# Patient Record
Sex: MALE | Race: BLACK OR AFRICAN AMERICAN | NOT HISPANIC OR LATINO | Employment: FULL TIME | URBAN - METROPOLITAN AREA
[De-identification: names, ages, dates, MRNs, and addresses within clinical notes are randomized per-mention and may not be internally consistent; named-entity substitution may affect disease eponyms.]

---

## 2024-06-01 ENCOUNTER — HOSPITAL ENCOUNTER (EMERGENCY)
Facility: HOSPITAL | Age: 24
Discharge: HOME | End: 2024-06-01
Attending: EMERGENCY MEDICINE

## 2024-06-01 VITALS
WEIGHT: 172 LBS | HEIGHT: 73 IN | DIASTOLIC BLOOD PRESSURE: 64 MMHG | TEMPERATURE: 98 F | RESPIRATION RATE: 20 BRPM | OXYGEN SATURATION: 98 % | HEART RATE: 97 BPM | BODY MASS INDEX: 22.8 KG/M2 | SYSTOLIC BLOOD PRESSURE: 126 MMHG

## 2024-06-01 DIAGNOSIS — B34.9 VIRAL SYNDROME: Primary | ICD-10-CM

## 2024-06-01 DIAGNOSIS — Z20.2 CHLAMYDIA CONTACT, UNTREATED: ICD-10-CM

## 2024-06-01 LAB
FLUAV RNA RESP QL NAA+PROBE: NEGATIVE
FLUBV RNA RESP QL NAA+PROBE: NEGATIVE
RSV RNA RESP QL NAA+PROBE: NEGATIVE
S PYO DNA THROAT QL NAA+PROBE: NOT DETECTED
SARS-COV-2 RNA RESP QL NAA+PROBE: NEGATIVE

## 2024-06-01 PROCEDURE — 0241U HB NFCT DS VIR RESP RNA 4 TRGT: CPT | Performed by: EMERGENCY MEDICINE

## 2024-06-01 PROCEDURE — 87591 N.GONORRHOEAE DNA AMP PROB: CPT | Performed by: EMERGENCY MEDICINE

## 2024-06-01 PROCEDURE — 87651 STREP A DNA AMP PROBE: CPT | Performed by: EMERGENCY MEDICINE

## 2024-06-01 PROCEDURE — 99283 EMERGENCY DEPT VISIT LOW MDM: CPT

## 2024-06-01 PROCEDURE — 87491 CHLMYD TRACH DNA AMP PROBE: CPT | Performed by: EMERGENCY MEDICINE

## 2024-06-01 PROCEDURE — 99284 EMERGENCY DEPT VISIT MOD MDM: CPT | Performed by: EMERGENCY MEDICINE

## 2024-06-01 RX ORDER — DOXYCYCLINE HYCLATE 100 MG/1
100 CAPSULE ORAL 2 TIMES DAILY
Qty: 14 CAPSULE | Refills: 0 | Status: SHIPPED | OUTPATIENT
Start: 2024-06-01 | End: 2024-06-08

## 2024-06-01 NOTE — Clinical Note
Yo Alaniz was seen and treated in our emergency department on 6/1/2024.                Diagnosis:     Yo  may return to work on return date.    He may return on this date: 06/02/2024         If you have any questions or concerns, please don't hesitate to call.      Charles Arias MD    ______________________________           _______________          _______________  Hospital Representative                              Date                                Time

## 2024-06-02 NOTE — ED PROVIDER NOTES
History  Chief Complaint   Patient presents with    Flu Symptoms     Pt arrived ambulatory with c/o flu like symptoms since yesterday     23-year-old male, presents with vomiting, diarrhea, and congestion since yesterday.  Patient states after eating a hamburger at a work picnic yesterday, he started to feel sick and vomited, had diarrhea afterwards.  Symptoms continued this morning but have been improving since.  Reports feeling subjective fevers yesterday.  Reports congestion and ear pressure.  Patient also reports that his fiancée tested positive for chlamydia and he would like to be tested, denies any symptoms such as penile discharge or dysuria.      History provided by:  Patient   used: No    Flu Symptoms  Presenting symptoms: diarrhea, fever and vomiting    Associated symptoms: nasal congestion        None       History reviewed. No pertinent past medical history.    History reviewed. No pertinent surgical history.    History reviewed. No pertinent family history.  I have reviewed and agree with the history as documented.    E-Cigarette/Vaping     E-Cigarette/Vaping Substances     Social History     Tobacco Use    Smoking status: Never    Smokeless tobacco: Current   Substance Use Topics    Alcohol use: Never    Drug use: Never       Review of Systems   Constitutional:  Positive for fever.   HENT:  Positive for congestion.    Cardiovascular: Negative.    Gastrointestinal:  Positive for diarrhea and vomiting.   Genitourinary: Negative.    Neurological: Negative.        Physical Exam  Physical Exam  Vitals and nursing note reviewed.   Constitutional:       General: He is not in acute distress.  HENT:      Head: Normocephalic and atraumatic.      Right Ear: Ear canal and external ear normal.      Left Ear: Ear canal and external ear normal.      Ears:      Comments: Clear fluid behind bilateral TMs, no erythema, no bulging     Mouth/Throat:      Mouth: Mucous membranes are moist.       Pharynx: Oropharynx is clear.   Cardiovascular:      Rate and Rhythm: Normal rate and regular rhythm.   Pulmonary:      Effort: Pulmonary effort is normal.      Breath sounds: Normal breath sounds.   Abdominal:      Palpations: Abdomen is soft.      Tenderness: There is no abdominal tenderness.   Musculoskeletal:         General: Normal range of motion.   Skin:     General: Skin is warm and dry.   Neurological:      General: No focal deficit present.      Mental Status: He is alert and oriented to person, place, and time.         Vital Signs  ED Triage Vitals [06/01/24 1912]   Temperature Pulse Respirations Blood Pressure SpO2   98 °F (36.7 °C) 97 20 126/64 98 %      Temp Source Heart Rate Source Patient Position - Orthostatic VS BP Location FiO2 (%)   Oral Monitor Sitting Left arm --      Pain Score       --           Vitals:    06/01/24 1912   BP: 126/64   Pulse: 97   Patient Position - Orthostatic VS: Sitting         Visual Acuity      ED Medications  Medications - No data to display    Diagnostic Studies  Results Reviewed       Procedure Component Value Units Date/Time    Chlamydia/GC amplified DNA by PCR [[id number]] Collected: 06/01/24 2024    Lab Status: In process Specimen: Urine, Other Updated: 06/01/24 2027    FLU/RSV/COVID - if FLU/RSV clinically relevant [[id number]]  (Normal) Collected: 06/01/24 1914    Lab Status: Final result Specimen: Nares from Nose Updated: 06/01/24 2005     SARS-CoV-2 Negative     INFLUENZA A PCR Negative     INFLUENZA B PCR Negative     RSV PCR Negative    Narrative:      FOR PEDIATRIC PATIENTS - copy/paste COVID Guidelines URL to browser: [email or web address]    SARS-CoV-2 assay is a Nucleic Acid Amplification assay intended for the  qualitative detection of nucleic acid from SARS-CoV-2 in nasopharyngeal  swabs. Results are for the presumptive identification of SARS-CoV-2 RNA.    Positive results are indicative of infection  with SARS-CoV-2, the virus  causing COVID-19, but do not rule out bacterial infection or co-infection  with other viruses. Laboratories within the [place] and its  territories are required to report all positive results to the appropriate  public health authorities. Negative results do not preclude SARS-CoV-2  infection and should not be used as the sole basis for treatment or other  patient management decisions. Negative results must be combined with  clinical observations, patient history, and epidemiological information.  This test has not been FDA cleared or approved.    This test has been authorized by FDA under an Emergency Use Authorization  (EUA). This test is only authorized for the duration of time the  declaration that circumstances exist justifying the authorization of the  emergency use of an in vitro diagnostic tests for detection of SARS-CoV-2  virus and/or diagnosis of COVID-19 infection under section 564(b)(1) of  the Act, 21 U.S.C. 360bbb-3(b)(1), unless the authorization is terminated  or revoked sooner. The test has been validated but independent review by FDA  and CLIA is pending.    Test performed using [employer]: This RT-PCR assay targets N2,  a region unique to SARS-CoV-2. A conserved region in the E-gene was chosen  for pan-Sarbecovirus detection which includes SARS-CoV-2.    According to CMS-2020-01-R, this platform meets the definition of high-throughput technology.    Strep A PCR [[id number]]  (Normal) Collected: 06/01/24 1914    Lab Status: Final result Specimen: Throat Updated: 06/01/24 1954     STREP A PCR Not Detected                   No orders to display              Procedures  Procedures         ED Course                               SBIRT 20yo+      Flowsheet Row Most Recent Value   Initial Alcohol Screen: US AUDIT-C     1. How often do you have a drink containing alcohol? 0 Filed at: 06/01/2024 1913   2. How many drinks containing alcohol do you have on a typical  day you are drinking?  0 Filed at: 06/01/2024 1913   3a. Male UNDER 65: How often do you have five or more drinks on one occasion? 0 Filed at: 06/01/2024 1913   Audit-C Score 0 Filed at: 06/01/2024 1913   SABRINA: How many times in the past year have you...    Used an illegal drug or used a prescription medication for non-medical reasons? Never Filed at: 06/01/2024 1913                      Medical Decision Making  23-year-old male, presents with symptoms of vomiting and diarrhea starting yesterday.  Differential diagnosis includes viral illness, food poisoning, dehydration.  Patient looks well in no distress, well-hydrated on exam.  Patient also reports partner who tested positive for chlamydia, will send urine for GC chlamydia, treat patient for chlamydia with oral antibiotic.  Viral PCR testing sent from triage.    Patient looks well in ED, feels well to be discharged home.  Instructed to rest, keep yourself well-hydrated by drinking fluids frequently, follow-up or return for any worsening or new concerning symptoms.    Amount and/or Complexity of Data Reviewed  Labs: ordered. Decision-making details documented in ED Course.    Risk  Prescription drug management.             Disposition  Final diagnoses:   Viral syndrome   Chlamydia contact, untreated     Time reflects when diagnosis was documented in both MDM as applicable and the Disposition within this note       Time User Action Codes Description Comment    6/1/2024  8:21 PM Charles Arias Add [B34.9] Viral syndrome     6/1/2024  8:21 PM Charles Arias Add [Z20.2] Chlamydia contact, untreated           ED Disposition       ED Disposition   Discharge    Condition   Stable    Date/Time   Sat Jun 1, 2024 2021    Comment   Yo Alaniz discharge to home/self care.                   Follow-up Information    None         Patient's Medications   Discharge Prescriptions    DOXYCYCLINE HYCLATE (VIBRAMYCIN) 100 MG CAPSULE    Take 1 capsule (100 mg total) by mouth 2  (two) times a day for 7 days       Start Date: 6/1/2024  End Date: 6/8/2024       Order Dose: 100 mg       Quantity: 14 capsule    Refills: 0       No discharge procedures on file.    PDMP Review       None            ED Provider  Electronically Signed by             Charles Arias MD  06/01/24 2027       Charles Arias MD  06/01/24 2035

## 2024-06-03 LAB
C TRACH DNA SPEC QL NAA+PROBE: POSITIVE
N GONORRHOEA DNA SPEC QL NAA+PROBE: NEGATIVE

## 2024-06-04 NOTE — RESULT ENCOUNTER NOTE
I attempted to call the patient in regards to the positive chlamydia results.  He is presently on doxycycline as he was exposed to a patient who was positive for for chlamydia per the chart notes.  I left a message for him to call for the results.

## 2024-06-04 NOTE — RESULT ENCOUNTER NOTE
Informed patient of positive chlamydia results.  Advised to abstain from sex for 7-10 days, inform partners so that they can be tested and treated as well, take antibiotics until completion.

## 2024-09-09 ENCOUNTER — APPOINTMENT (EMERGENCY)
Dept: RADIOLOGY | Facility: HOSPITAL | Age: 24
End: 2024-09-09

## 2024-09-09 ENCOUNTER — HOSPITAL ENCOUNTER (EMERGENCY)
Facility: HOSPITAL | Age: 24
Discharge: HOME | End: 2024-09-09

## 2024-09-09 VITALS
WEIGHT: 169.97 LBS | OXYGEN SATURATION: 97 % | TEMPERATURE: 97.9 F | RESPIRATION RATE: 18 BRPM | SYSTOLIC BLOOD PRESSURE: 119 MMHG | HEIGHT: 73 IN | DIASTOLIC BLOOD PRESSURE: 72 MMHG | BODY MASS INDEX: 22.53 KG/M2 | HEART RATE: 55 BPM

## 2024-09-09 DIAGNOSIS — M54.9 BACK PAIN: Primary | ICD-10-CM

## 2024-09-09 LAB
ATRIAL RATE: 51 BPM
P AXIS: -12 DEGREES
PR INTERVAL: 154 MS
QRS AXIS: -22 DEGREES
QRSD INTERVAL: 90 MS
QT INTERVAL: 402 MS
QTC INTERVAL: 370 MS
T WAVE AXIS: 9 DEGREES
VENTRICULAR RATE: 51 BPM

## 2024-09-09 PROCEDURE — 99283 EMERGENCY DEPT VISIT LOW MDM: CPT

## 2024-09-09 PROCEDURE — 94640 AIRWAY INHALATION TREATMENT: CPT

## 2024-09-09 PROCEDURE — 93010 ELECTROCARDIOGRAM REPORT: CPT | Performed by: INTERNAL MEDICINE

## 2024-09-09 PROCEDURE — 96372 THER/PROPH/DIAG INJ SC/IM: CPT

## 2024-09-09 PROCEDURE — 99284 EMERGENCY DEPT VISIT MOD MDM: CPT

## 2024-09-09 PROCEDURE — 71046 X-RAY EXAM CHEST 2 VIEWS: CPT

## 2024-09-09 PROCEDURE — 93005 ELECTROCARDIOGRAM TRACING: CPT

## 2024-09-09 RX ORDER — KETOROLAC TROMETHAMINE 30 MG/ML
30 INJECTION, SOLUTION INTRAMUSCULAR; INTRAVENOUS ONCE
Status: COMPLETED | OUTPATIENT
Start: 2024-09-09 | End: 2024-09-09

## 2024-09-09 RX ORDER — OXYCODONE HYDROCHLORIDE 5 MG/1
5 TABLET ORAL EVERY 6 HOURS PRN
Qty: 8 TABLET | Refills: 0 | Status: SHIPPED | OUTPATIENT
Start: 2024-09-09

## 2024-09-09 RX ORDER — LIDOCAINE 50 MG/G
1 PATCH TOPICAL DAILY
Qty: 30 PATCH | Refills: 0 | Status: SHIPPED | OUTPATIENT
Start: 2024-09-09

## 2024-09-09 RX ORDER — METHOCARBAMOL 500 MG/1
500 TABLET, FILM COATED ORAL 4 TIMES DAILY
Qty: 20 TABLET | Refills: 0 | Status: SHIPPED | OUTPATIENT
Start: 2024-09-09

## 2024-09-09 RX ORDER — PREDNISONE 20 MG/1
60 TABLET ORAL DAILY
Qty: 15 TABLET | Refills: 0 | Status: SHIPPED | OUTPATIENT
Start: 2024-09-09 | End: 2024-09-14

## 2024-09-09 RX ORDER — ALBUTEROL SULFATE 0.83 MG/ML
5 SOLUTION RESPIRATORY (INHALATION) ONCE
Status: COMPLETED | OUTPATIENT
Start: 2024-09-09 | End: 2024-09-09

## 2024-09-09 RX ORDER — NAPROXEN 500 MG/1
500 TABLET ORAL 2 TIMES DAILY WITH MEALS
Qty: 30 TABLET | Refills: 0 | Status: SHIPPED | OUTPATIENT
Start: 2024-09-09

## 2024-09-09 RX ORDER — METHYLPREDNISOLONE SODIUM SUCCINATE 125 MG/2ML
60 INJECTION, POWDER, LYOPHILIZED, FOR SOLUTION INTRAMUSCULAR; INTRAVENOUS ONCE
Status: COMPLETED | OUTPATIENT
Start: 2024-09-09 | End: 2024-09-09

## 2024-09-09 RX ADMIN — METHYLPREDNISOLONE SODIUM SUCCINATE 60 MG: 125 INJECTION, POWDER, FOR SOLUTION INTRAMUSCULAR; INTRAVENOUS at 12:06

## 2024-09-09 RX ADMIN — IPRATROPIUM BROMIDE 0.5 MG: 0.5 SOLUTION RESPIRATORY (INHALATION) at 12:06

## 2024-09-09 RX ADMIN — ALBUTEROL SULFATE 5 MG: 2.5 SOLUTION RESPIRATORY (INHALATION) at 12:06

## 2024-09-09 RX ADMIN — KETOROLAC TROMETHAMINE 30 MG: 30 INJECTION, SOLUTION INTRAMUSCULAR at 12:06

## 2024-09-09 NOTE — ED PROVIDER NOTES
History  Chief Complaint   Patient presents with    Back Pain     Pt arrived ambulatory c/o severe back pain onset this morning. Hx back pain, sciatica      23-year-old male patient presents to the ER for evaluation of back pain.  Patient states that he woke up with severe thoracic back pain.  Patient states that he works as a  and often lifts heavy objects.  He stated no new trauma or injury.  He reports that the pain is so severe that it hurts with deep inspiration.  Patient stated no recent URI symptoms, fever, chills, nausea or vomiting.  Patient is talking in full complete sentences without respiratory distress, tripoding or drooling.  Patient did not take any medication prior to arrival.  Patient reports that the pain is worse with range of motion.  No noted ecchymosis, erythema, open wounds or abrasions.        None       History reviewed. No pertinent past medical history.    History reviewed. No pertinent surgical history.    History reviewed. No pertinent family history.  I have reviewed and agree with the history as documented.    E-Cigarette/Vaping     E-Cigarette/Vaping Substances    THC Yes      Social History     Tobacco Use    Smoking status: Never    Smokeless tobacco: Current   Substance Use Topics    Alcohol use: Never    Drug use: Never       Review of Systems   Constitutional:  Negative for chills and fever.   HENT:  Negative for ear pain and sore throat.    Eyes:  Negative for pain and visual disturbance.   Respiratory:  Positive for chest tightness. Negative for cough and shortness of breath.    Cardiovascular:  Negative for chest pain and palpitations.   Gastrointestinal:  Negative for abdominal pain and vomiting.   Genitourinary:  Negative for dysuria and hematuria.   Musculoskeletal:  Positive for back pain. Negative for arthralgias.   Skin:  Negative for color change and rash.   Neurological:  Negative for seizures and syncope.   All other systems reviewed and are  negative.      Physical Exam  Physical Exam  Vitals and nursing note reviewed.   Constitutional:       General: He is not in acute distress.     Appearance: He is well-developed.   HENT:      Head: Normocephalic and atraumatic.      Right Ear: External ear normal.      Left Ear: External ear normal.   Eyes:      Conjunctiva/sclera: Conjunctivae normal.   Cardiovascular:      Rate and Rhythm: Normal rate and regular rhythm.      Pulses: Normal pulses.      Heart sounds: Normal heart sounds.   Pulmonary:      Effort: Pulmonary effort is normal. No respiratory distress.      Breath sounds: Normal breath sounds.   Abdominal:      Palpations: Abdomen is soft.      Tenderness: There is no abdominal tenderness.   Musculoskeletal:         General: No swelling.      Cervical back: Neck supple.      Thoracic back: Tenderness present.   Skin:     General: Skin is warm and dry.      Capillary Refill: Capillary refill takes less than 2 seconds.   Neurological:      Mental Status: He is alert and oriented to person, place, and time. Mental status is at baseline.      Cranial Nerves: No cranial nerve deficit.      Sensory: No sensory deficit.      Motor: No weakness.      Coordination: Coordination normal.   Psychiatric:         Mood and Affect: Mood normal.         Vital Signs  ED Triage Vitals [09/09/24 1019]   Temperature Pulse Respirations Blood Pressure SpO2   97.9 °F (36.6 °C) 55 18 119/72 97 %      Temp Source Heart Rate Source Patient Position - Orthostatic VS BP Location FiO2 (%)   Oral Monitor Sitting Left arm --      Pain Score       10 - Worst Possible Pain           Vitals:    09/09/24 1019   BP: 119/72   Pulse: 55   Patient Position - Orthostatic VS: Sitting         Visual Acuity      ED Medications  Medications   albuterol inhalation solution 5 mg (5 mg Nebulization Given 9/9/24 1206)   ipratropium (ATROVENT) 0.02 % inhalation solution 0.5 mg (0.5 mg Nebulization Given 9/9/24 1206)   ketorolac (TORADOL) injection 30  mg (30 mg Intramuscular Given 9/9/24 1206)   methylPREDNISolone sodium succinate (Solu-MEDROL) injection 60 mg (60 mg Intramuscular Given 9/9/24 1206)       Diagnostic Studies  Results Reviewed       None                   XR chest 2 views    (Results Pending)              Procedures  Procedures         ED Course                                               Medical Decision Making  This patient presents with back pain.  No back pain red flags on history or physical.  Presentation not consistent with malignancy, fracture, cauda equina, osteomyelitis or epidural abscess.  Patient does not have any CVA tenderness to doubt renal colic, pyelonephritis.  Patient complaining of severe back pain and did get a chest x-ray because he stated that he could not take a deep breath.  Patient's x-ray was read and interpreted by myself.  Official read pending.  Patient given multimodal regimen for pain.  Offered blood work, CT imaging and patient delightfully declined.  Comprehensive spine referral given and advised to follow-up with physical therapy.  Return to ER symptoms worsens or questions or concerns arise at home.        Amount and/or Complexity of Data Reviewed  Radiology: ordered.    Risk  Prescription drug management.                 Disposition  Final diagnoses:   Back pain     Time reflects when diagnosis was documented in both MDM as applicable and the Disposition within this note       Time User Action Codes Description Comment    9/9/2024  1:46 PM Dahiana Sanchez Add [M54.9] Back pain           ED Disposition       ED Disposition   Discharge    Condition   Stable    Date/Time   Mon Sep 9, 2024  1:46 PM    Comment   Yo Alaniz discharge to home/self care.                   Follow-up Information       Follow up With Specialties Details Why Contact Info Additional Information    [facility] Physical Therapy   [phone]    [facility]  Emergency Medicine   [address] [address]  [phone] [facility] Emergency Department, [address] [place], [place], [address]            Discharge Medication List as of 9/9/2024  1:48 PM        START taking these medications    Details   lidocaine (Lidoderm) 5 % Apply 1 patch topically over 12 hours daily Remove & Discard patch within 12 hours or as directed by MD, Starting Mon 9/9/2024, Normal      methocarbamol (ROBAXIN) 500 mg tablet Take 1 tablet (500 mg total) by mouth 4 (four) times a day, Starting Mon 9/9/2024, Normal      naproxen (Naprosyn) 500 mg tablet Take 1 tablet (500 mg total) by mouth 2 (two) times a day with meals, Starting Mon 9/9/2024, Normal      oxyCODONE (Roxicodone) 5 immediate release tablet Take 1 tablet (5 mg total) by mouth every 6 (six) hours as needed for severe pain for up to 8 doses Max Daily Amount: 20 mg, Starting Mon 9/9/2024, Normal      predniSONE 20 mg tablet Take 3 tablets (60 mg total) by mouth daily for 5 days, Starting Mon 9/9/2024, Until Sat 9/14/2024, Normal             No discharge procedures on file.    PDMP Review       None            ED Provider  Electronically Signed by             LEE Guzman  09/09/24 6578

## 2024-09-09 NOTE — DISCHARGE INSTRUCTIONS
Tylenol/naproxen  Prednisone-take with food  Robaxin- may make you sleepy  Lidocaine patch-change every 12 hours  Oxycodone- for severe pain- do not operate heavy machinery, drink alcohol or drive on medications  Follow up with comprehensive spine program  Return to ER if symptoms worsen

## 2024-09-09 NOTE — Clinical Note
Yo Alaniz was seen and treated in our emergency department on 9/9/2024.                Diagnosis:     Yo  may return to work on return date.    He may return on this date: 09/10/2024         If you have any questions or concerns, please don't hesitate to call.      LEE Guzman    ______________________________           _______________          _______________  Hospital Representative                              Date                                Time